# Patient Record
Sex: MALE | Race: WHITE | Employment: PART TIME | URBAN - METROPOLITAN AREA
[De-identification: names, ages, dates, MRNs, and addresses within clinical notes are randomized per-mention and may not be internally consistent; named-entity substitution may affect disease eponyms.]

---

## 2018-01-22 ENCOUNTER — HOSPITAL ENCOUNTER (INPATIENT)
Age: 28
LOS: 3 days | Discharge: HOME | DRG: 885 | End: 2018-01-26
Attending: EMERGENCY MEDICINE | Admitting: PSYCHIATRY & NEUROLOGY
Payer: OTHER GOVERNMENT

## 2018-01-22 DIAGNOSIS — R45.851 SUICIDAL THOUGHTS: ICD-10-CM

## 2018-01-22 DIAGNOSIS — F32.A DEPRESSION, UNSPECIFIED DEPRESSION TYPE: Primary | ICD-10-CM

## 2018-01-22 LAB
ALBUMIN SERPL-MCNC: 4.7 G/DL (ref 3.4–5)
ALBUMIN/GLOB SERPL: 1.4 {RATIO} (ref 0.8–1.7)
ALP SERPL-CCNC: 60 U/L (ref 45–117)
ALT SERPL-CCNC: 190 U/L (ref 16–61)
AMPHET UR QL SCN: NEGATIVE
ANION GAP SERPL CALC-SCNC: 6 MMOL/L (ref 3–18)
AST SERPL-CCNC: 59 U/L (ref 15–37)
BARBITURATES UR QL SCN: NEGATIVE
BASOPHILS # BLD: 0 K/UL (ref 0–0.1)
BASOPHILS NFR BLD: 0 % (ref 0–2)
BENZODIAZ UR QL: NEGATIVE
BILIRUB SERPL-MCNC: 0.7 MG/DL (ref 0.2–1)
BUN SERPL-MCNC: 10 MG/DL (ref 7–18)
BUN/CREAT SERPL: 12 (ref 12–20)
CALCIUM SERPL-MCNC: 9 MG/DL (ref 8.5–10.1)
CANNABINOIDS UR QL SCN: NEGATIVE
CHLORIDE SERPL-SCNC: 104 MMOL/L (ref 100–108)
CO2 SERPL-SCNC: 31 MMOL/L (ref 21–32)
COCAINE UR QL SCN: NEGATIVE
CREAT SERPL-MCNC: 0.81 MG/DL (ref 0.6–1.3)
DIFFERENTIAL METHOD BLD: ABNORMAL
EOSINOPHIL # BLD: 0.1 K/UL (ref 0–0.4)
EOSINOPHIL NFR BLD: 1 % (ref 0–5)
ERYTHROCYTE [DISTWIDTH] IN BLOOD BY AUTOMATED COUNT: 12.4 % (ref 11.6–14.5)
ETHANOL SERPL-MCNC: <3 MG/DL (ref 0–3)
GLOBULIN SER CALC-MCNC: 3.3 G/DL (ref 2–4)
GLUCOSE SERPL-MCNC: 87 MG/DL (ref 74–99)
HCT VFR BLD AUTO: 45.7 % (ref 36–48)
HDSCOM,HDSCOM: NORMAL
HGB BLD-MCNC: 15.5 G/DL (ref 13–16)
LYMPHOCYTES # BLD: 2.1 K/UL (ref 0.9–3.6)
LYMPHOCYTES NFR BLD: 21 % (ref 21–52)
MCH RBC QN AUTO: 30.2 PG (ref 24–34)
MCHC RBC AUTO-ENTMCNC: 33.9 G/DL (ref 31–37)
MCV RBC AUTO: 88.9 FL (ref 74–97)
METHADONE UR QL: NEGATIVE
MONOCYTES # BLD: 0.9 K/UL (ref 0.05–1.2)
MONOCYTES NFR BLD: 9 % (ref 3–10)
NEUTS SEG # BLD: 7 K/UL (ref 1.8–8)
NEUTS SEG NFR BLD: 69 % (ref 40–73)
OPIATES UR QL: NEGATIVE
PCP UR QL: NEGATIVE
PLATELET # BLD AUTO: 272 K/UL (ref 135–420)
PMV BLD AUTO: 11.9 FL (ref 9.2–11.8)
POTASSIUM SERPL-SCNC: 3.8 MMOL/L (ref 3.5–5.5)
PROT SERPL-MCNC: 8 G/DL (ref 6.4–8.2)
RBC # BLD AUTO: 5.14 M/UL (ref 4.7–5.5)
SODIUM SERPL-SCNC: 141 MMOL/L (ref 136–145)
WBC # BLD AUTO: 10.1 K/UL (ref 4.6–13.2)

## 2018-01-22 PROCEDURE — 80307 DRUG TEST PRSMV CHEM ANLYZR: CPT | Performed by: EMERGENCY MEDICINE

## 2018-01-22 PROCEDURE — 80053 COMPREHEN METABOLIC PANEL: CPT | Performed by: NURSE PRACTITIONER

## 2018-01-22 PROCEDURE — 99282 EMERGENCY DEPT VISIT SF MDM: CPT

## 2018-01-22 PROCEDURE — 85025 COMPLETE CBC W/AUTO DIFF WBC: CPT | Performed by: NURSE PRACTITIONER

## 2018-01-22 NOTE — IP AVS SNAPSHOT
[facility] 
 
 
 [address] [facility] [address] 66 Patient: Rashid Greenberg MRN: [id number] :1990 About your hospitalization You were admitted on:  2018 You last received care in the:  [facility] 1 ADULT CHEM DEP You were discharged on:  2018 Why you were hospitalized Your primary diagnosis was:  Bipolar 2 Disorder, Major Depressive Episode (Hcc) Follow-up Information Follow up With Details Comments Contact Info [address] [place] [address] On 2018 with Dr Bella Young @ [facility].. Please arrive 12:30pm to complete registration. . A Medication Appointment will be made after this Initial session. 20 [address] [address] 
[phone] Discharge Orders None A check gustavo indicates which time of day the medication should be taken. My Medications START taking these medications Instructions Each Dose to Equal  
 Morning Noon Evening Bedtime  
 traZODone 50 mg tablet Commonly known as:  Rae Barnett Your last dose was: Your next dose is: Take 1 Tab by mouth nightly. Indications: insomnia associated with depression 50 mg CHANGE how you take these medications Instructions Each Dose to Equal  
 Morning Noon Evening Bedtime  
 lithium carbonate 150 mg capsule What changed:   
- how much to take - when to take this Your last dose was: Your next dose is: Take 1 Cap by mouth three (3) times daily (with meals). Indications: Bipolar Disorder 150 mg  
    
   
   
   
  
 lurasidone 20 mg Tab tablet Commonly known as:  Evelin Guzman What changed:   
- medication strength 
- how much to take - when to take this Your last dose was: Your next dose is: Take 1 Tab by mouth daily (with breakfast).  Indications: DEPRESSION ASSOCIATED WITH BIPOLAR DISORDER  
 20 mg  
 Where to Get Your Medications Information on where to get these meds will be given to you by the nurse or doctor. ! Ask your nurse or doctor about these medications  
  lithium carbonate 150 mg capsule  
 lurasidone 20 mg Tab tablet  
 traZODone 50 mg tablet Discharge Instructions BEHAVIORAL HEALTH NURSING DISCHARGE NOTE Emergency Numbers [facility] [facility] Desk: [phone] [place] [facility]: [phone] Suicide Prevention Line: [phone] (TALK) The following personal items collected during your admission are returned to you:  
Dental Appliance: Dental Appliances: None Vision:   
Hearing Aid:   
Jewelry: Jewelry: None Clothing: Clothing: Belt, Footwear, Pants, Shirt, Socks, Undergarments Other Valuables: Other Valuables: Cell Phone, [email or web address] Valuables sent to safe: Personal Items Sent to Safe:  (two debit visa cards,  identification) The discharge information has been reviewed with the patient. The patient verbalized understanding. Boosterville Activation Thank you for requesting access to [employer]. Please follow the instructions below to securely access and download your online medical record. Boosterville allows you to send messages to your doctor, view your test results, renew your prescriptions, schedule appointments, and more. How Do I Sign Up? In your internet browser, go to [email or web address] Click on the First Time User? Click Here link in the Sign In box. You will be redirect to the New Member Sign Up page. Enter your Boosterville Access Code exactly as it appears below. You will not need to use this code after youve completed the sign-up process. If you do not sign up before the expiration date, you must request a new code. Boost[place] Access Code: [email or web address] Expires: 2018  9:10 AM (This is the date your Boosterville access code will ) Enter the last four digits of your Social Security Number (xxxx) and Date of Birth (mm/dd/yyyy) as indicated and click Submit. You will be taken to the next sign-up page. Create a [employer] ID. This will be your [employer] login ID and cannot be changed, so think of one that is secure and easy to remember. Create a [employer] password. You can change your password at any time. Enter your Password Reset Question and Answer. This can be used at a later time if you forget your password. Enter your e-mail address. You will receive e-mail notification when new information is available in [address] Click Sign Up. You can now view and download portions of your medical record. Click the Soapbox link to download a portable copy of your medical information. Additional Information If you have questions, please visit the Frequently Asked Questions section of the [employer] website at [email or web address] Remember, [employer] is NOT to be used for urgent needs. For medical emergencies, dial 911. Patient armband removed and shredded [employer] Announcement We are excited to announce that we are making your provider's discharge notes available to you in [employer]. You will see these notes when they are completed and signed by the physician that discharged you from your recent hospital stay. If you have any questions or concerns about any information you see in [employer], please call the Health Information Department where you were seen or reach out to your Primary Care Provider for more information about your plan of care. Introducing [facility] [place] [facility]! [facility] introduces [employer] patient portal. Now you can access parts of your medical record, email your doctor's office, and request medication refills online. 1. In your internet browser, go to [email or web address] 2. Click on the First Time User? Click Here link in the Sign In box.  You will see the New Member Sign Up page. 3. Enter your SwipeStation Access Code exactly as it appears below. You will not need to use this code after youve completed the sign-up process. If you do not sign up before the expiration date, you must request a new code. · SwipeStation Access Code: [email or web address] Expires: 4/23/2018  9:10 AM 
 
4. Enter the last four digits of your Social Security Number (xxxx) and Date of Birth (mm/dd/yyyy) as indicated and click Submit. You will be taken to the next sign-up page. 5. Create a [employer] Holdings Internationalt ID. This will be your SwipeStation login ID and cannot be changed, so think of one that is secure and easy to remember. 6. Create a SwipeStation password. You can change your password at any time. 7. Enter your Password Reset Question and Answer. This can be used at a later time if you forget your password. 8. Enter your e-mail address. You will receive e-mail notification when new information is available in [address] 9. Click Sign Up. You can now view and download portions of your medical record. 10. Click the Download Summary menu link to download a portable copy of your medical information. If you have questions, please visit the Frequently Asked Questions section of the SwipeStation website. Remember, SwipeStation is NOT to be used for urgent needs. For medical emergencies, dial 911. Now available from your iPhone and Android! Providers Seen During Your Hospitalization Provider Specialty Primary office phone Jeanine Arana MD Emergency Medicine [phone] Nolvia Campos MD Emergency Medicine [phone] Ada Foy MD Psychiatry [phone] Your Primary Care Physician (PCP) Primary Care Physician Office Phone Office Fax NONE ** None ** ** None ** You are allergic to the following Allergen Reactions Amoxicillin Anaphylaxis Recent Documentation Height Weight BMI Smoking Status 1.778 m 99.8 kg 31.57 kg/m2 Current Every Day Smoker Emergency Contacts Name Discharge Info Relation Home Work Mobile [facility] DISCHARGE CAREGIVER [3] Father [15] [phone] Patient Belongings The following personal items are in your possession at time of discharge: 
  Dental Appliances: None         Home Medications: None   Jewelry: None  Clothing: Belt, Footwear, Pants, Shirt, Socks, Undergarments    Other Valuables: Pako, Julio Cesar Gentleman  Personal Items Sent to Safe:  (two debit visa cards,  identification) Please provide this summary of care documentation to your next provider. Signatures-by signing, you are acknowledging that this After Visit Summary has been reviewed with you and you have received a copy. Patient Signature:  ____________________________________________________________ Date:  ____________________________________________________________  
  
Liliana Dale Provider Signature:  ____________________________________________________________ Date:  ____________________________________________________________

## 2018-01-22 NOTE — ED TRIAGE NOTES
Pt crying in triage, unable to focus, delayed answers. Pt states he has bipolar disorder & feels suicidal.  Pt denies HI.

## 2018-01-22 NOTE — IP AVS SNAPSHOT
Summary of Care Report The Summary of Care report has been created to help improve care coordination. Users with access to Instagarage or [address] (Web-based application) may access additional patient information including the Discharge Summary. If you are not currently a [address] user and need more information, please call the number listed below in the [place] [address] section and ask to be connected with Medical Records. Facility Information Name Address Phone [address] [facility] [address] [facility] [address] [phone] Patient Information Patient Name Sex GWEN Mccall ([id number]) Male 1990 Discharge Information Admitting Provider Service Area Unit Charmaine Ruth MD / [address] 1 Adult Chem Dep / [phone] Discharge Provider Discharge Date/Time Discharge Disposition Destination (none) 2018 Morning (Pending) AHR (none) Patient Language Language ENGLISH [13] Hospital Problems as of 2018  Never Reviewed Class Noted - Resolved Last Modified POA Active Problems * (Principal)Bipolar 2 disorder, major depressive episode ([facility] [address]  2018 - Present 2018 by Charmaine Ruth MD Yes Entered by Charmaine Ruth MD  
  
You are allergic to the following Allergen Reactions Amoxicillin Anaphylaxis Current Discharge Medication List  
  
START taking these medications Dose & Instructions Dispensing Information Comments  
 traZODone 50 mg tablet Commonly known as:  Emanuel Fritz Dose:  50 mg Take 1 Tab by mouth nightly. Indications: insomnia associated with depression Quantity:  30 Tab Refills:  0 CONTINUE these medications which have CHANGED Dose & Instructions Dispensing Information Comments  
 lithium carbonate 150 mg capsule What changed:   
- how much to take - when to take this Dose:  150 mg Take 1 Cap by mouth three (3) times daily (with meals). Indications: Bipolar Disorder Quantity:  90 Cap Refills:  0  
   
 lurasidone 20 mg Tab tablet Commonly known as:  Jose Swartz What changed:   
- medication strength 
- how much to take - when to take this Dose:  20 mg Take 1 Tab by mouth daily (with breakfast). Indications: DEPRESSION ASSOCIATED WITH BIPOLAR DISORDER Quantity:  30 Tab Refills:  0 Follow-up Information Follow up With Details Comments Contact Info [address] [place] [address] On 1/30/2018 with Dr Clya Stahl @ [facility].. Please arrive 12:30pm to complete registration. . A Medication Appointment will be made after this Initial session. [address] [place] [address] [phone] Discharge Instructions BEHAVIORAL HEALTH NURSING DISCHARGE NOTE Emergency Numbers [facility] [facility] Desk: [phone] [place] [facility]: [phone] Suicide Prevention Line: [phone] (TALK) The following personal items collected during your admission are returned to you:  
Dental Appliance: Dental Appliances: None Vision:   
Hearing Aid:   
Jewelry: Jewelry: None Clothing: Clothing: Belt, Footwear, Pants, Shirt, Socks, Undergarments Other Valuables: Other Valuables: Cell Phone, [employer] Valuables sent to safe: Personal Items Sent to Safe:  (two debit visa cards,  identification) The discharge information has been reviewed with the patient. The patient verbalized understanding. DocLanding Activation Thank you for requesting access to DocLanding. Please follow the instructions below to securely access and download your online medical record. DocLanding allows you to send messages to your doctor, view your test results, renew your prescriptions, schedule appointments, and more. How Do I Sign Up? In your internet browser, go to [email or web address] Click on the First Time User? Click Here link in the Sign In box. You will be redirect to the New Member Sign Up page. Enter your SHERPA assistant Access Code exactly as it appears below. You will not need to use this code after youve completed the sign-up process. If you do not sign up before the expiration date, you must request a new code. SHERPA assistant Access Code: [email or web address] Expires: 2018  9:10 AM (This is the date your SHERPA assistant access code will ) Enter the last four digits of your Social Security Number (xxxx) and Date of Birth (mm/dd/yyyy) as indicated and click Submit. You will be taken to the next sign-up page. Create a SHERPA assistant ID. This will be your SHERPA assistant login ID and cannot be changed, so think of one that is secure and easy to remember. Create a SHERPA assistant password. You can change your password at any time. Enter your Password Reset Question and Answer. This can be used at a later time if you forget your password. Enter your e-mail address. You will receive e-mail notification when new information is available in [address] Click Sign Up. You can now view and download portions of your medical record. Click the Yandex link to download a portable copy of your medical information. Additional Information If you have questions, please visit the Frequently Asked Questions section of the SHERPA assistant website at [email or web address] Remember, SHERPA assistant is NOT to be used for urgent needs. For medical emergencies, dial 911. Patient armband removed and shredded Chart Review Routing History No Routing History on File

## 2018-01-23 PROBLEM — F31.9 BIPOLAR 1 DISORDER (HCC): Status: ACTIVE | Noted: 2018-01-23

## 2018-01-23 PROBLEM — F31.81 BIPOLAR 2 DISORDER, MAJOR DEPRESSIVE EPISODE (HCC): Status: ACTIVE | Noted: 2018-01-23

## 2018-01-23 LAB
APPEARANCE UR: CLEAR
BACTERIA URNS QL MICRO: ABNORMAL /HPF
BILIRUB UR QL: ABNORMAL
COLOR UR: ABNORMAL
EPITH CASTS URNS QL MICRO: ABNORMAL /LPF (ref 0–5)
GLUCOSE UR STRIP.AUTO-MCNC: NEGATIVE MG/DL
HGB UR QL STRIP: NEGATIVE
KETONES UR QL STRIP.AUTO: ABNORMAL MG/DL
LEUKOCYTE ESTERASE UR QL STRIP.AUTO: ABNORMAL
MUCOUS THREADS URNS QL MICRO: ABNORMAL /LPF
NITRITE UR QL STRIP.AUTO: NEGATIVE
PH UR STRIP: 6 [PH] (ref 5–8)
PROT UR STRIP-MCNC: 30 MG/DL
RBC #/AREA URNS HPF: ABNORMAL /HPF (ref 0–5)
SP GR UR REFRACTOMETRY: >1.03 (ref 1–1.03)
TSH SERPL DL<=0.05 MIU/L-ACNC: 0.86 UIU/ML (ref 0.36–3.74)
UROBILINOGEN UR QL STRIP.AUTO: 1 EU/DL (ref 0.2–1)
WBC URNS QL MICRO: ABNORMAL /HPF (ref 0–4)

## 2018-01-23 PROCEDURE — 36415 COLL VENOUS BLD VENIPUNCTURE: CPT | Performed by: PSYCHIATRY & NEUROLOGY

## 2018-01-23 PROCEDURE — 84443 ASSAY THYROID STIM HORMONE: CPT | Performed by: PSYCHIATRY & NEUROLOGY

## 2018-01-23 PROCEDURE — 65220000005 HC RM SEMIPRIVATE PSYCH 3 OR 4 BED

## 2018-01-23 PROCEDURE — 74011250637 HC RX REV CODE- 250/637: Performed by: PSYCHIATRY & NEUROLOGY

## 2018-01-23 PROCEDURE — 81001 URINALYSIS AUTO W/SCOPE: CPT | Performed by: NURSE PRACTITIONER

## 2018-01-23 RX ORDER — LITHIUM CARBONATE 150 MG/1
150 CAPSULE ORAL 2 TIMES DAILY
Status: DISCONTINUED | OUTPATIENT
Start: 2018-01-23 | End: 2018-01-26 | Stop reason: HOSPADM

## 2018-01-23 RX ORDER — LORAZEPAM 2 MG/ML
1 INJECTION INTRAMUSCULAR
Status: DISCONTINUED | OUTPATIENT
Start: 2018-01-23 | End: 2018-01-26 | Stop reason: HOSPADM

## 2018-01-23 RX ORDER — HALOPERIDOL 5 MG/1
5 TABLET ORAL
Status: DISCONTINUED | OUTPATIENT
Start: 2018-01-23 | End: 2018-01-26 | Stop reason: HOSPADM

## 2018-01-23 RX ORDER — HALOPERIDOL 5 MG/ML
5 INJECTION INTRAMUSCULAR
Status: DISCONTINUED | OUTPATIENT
Start: 2018-01-23 | End: 2018-01-26 | Stop reason: HOSPADM

## 2018-01-23 RX ORDER — TRAZODONE HYDROCHLORIDE 50 MG/1
50 TABLET ORAL
Status: DISCONTINUED | OUTPATIENT
Start: 2018-01-23 | End: 2018-01-23

## 2018-01-23 RX ORDER — IBUPROFEN 600 MG/1
600 TABLET ORAL
Status: DISCONTINUED | OUTPATIENT
Start: 2018-01-23 | End: 2018-01-26 | Stop reason: HOSPADM

## 2018-01-23 RX ORDER — LORAZEPAM 1 MG/1
1 TABLET ORAL
Status: DISCONTINUED | OUTPATIENT
Start: 2018-01-23 | End: 2018-01-26 | Stop reason: HOSPADM

## 2018-01-23 RX ORDER — LORAZEPAM 2 MG/ML
2 INJECTION INTRAMUSCULAR
Status: DISCONTINUED | OUTPATIENT
Start: 2018-01-23 | End: 2018-01-26 | Stop reason: HOSPADM

## 2018-01-23 RX ORDER — LORAZEPAM 1 MG/1
2 TABLET ORAL
Status: DISCONTINUED | OUTPATIENT
Start: 2018-01-23 | End: 2018-01-26 | Stop reason: HOSPADM

## 2018-01-23 RX ORDER — LITHIUM CARBONATE 150 MG/1
CAPSULE ORAL 3 TIMES DAILY
COMMUNITY
End: 2018-01-26

## 2018-01-23 RX ORDER — TRAZODONE HYDROCHLORIDE 50 MG/1
50 TABLET ORAL
Status: DISCONTINUED | OUTPATIENT
Start: 2018-01-23 | End: 2018-01-26 | Stop reason: HOSPADM

## 2018-01-23 RX ADMIN — LORAZEPAM 2 MG: 1 TABLET ORAL at 17:45

## 2018-01-23 RX ADMIN — TRAZODONE HYDROCHLORIDE 50 MG: 50 TABLET ORAL at 21:45

## 2018-01-23 RX ADMIN — LITHIUM CARBONATE 150 MG: 150 CAPSULE, GELATIN COATED ORAL at 21:45

## 2018-01-23 RX ADMIN — LURASIDONE HYDROCHLORIDE 20 MG: 20 TABLET, FILM COATED ORAL at 12:16

## 2018-01-23 NOTE — ED PROVIDER NOTES
HPI Comments: Pt presents with hx of mh illness and does not take her medications. States suicidal thoughts, no plan, no medical complaints    Patient is a 32 y.o. male presenting with mental health disorder. The history is provided by the patient. No  was used. Mental Health Problem   The primary symptoms include dysphoric mood. The current episode started today. This is a new problem. The dysphoric mood began this week. The mood has been unchanged since its onset. He characterizes the problem as moderate. The mood includes feelings of sadness. The degree of incapacity that he is experiencing as a consequence of his illness is moderate. Additional symptoms of the illness include anhedonia. Additional symptoms of the illness do not include headaches or abdominal pain. He admits to suicidal ideas. He does not have a plan to commit suicide. He contemplates harming himself. He has not already injured self. He does not contemplate injuring another person. He has not already  injured another person. Risk factors that are present for mental illness include a history of mental illness. History reviewed. No pertinent past medical history. History reviewed. No pertinent surgical history. History reviewed. No pertinent family history. Social History     Social History    Marital status: SINGLE     Spouse name: N/A    Number of children: N/A    Years of education: N/A     Occupational History    Not on file. Social History Main Topics    Smoking status: Current Every Day Smoker    Smokeless tobacco: Never Used    Alcohol use Yes    Drug use: No    Sexual activity: Not on file     Other Topics Concern    Not on file     Social History Narrative    No narrative on file         ALLERGIES: Amoxicillin    Review of Systems   Constitutional: Negative for fever. Gastrointestinal: Negative for abdominal pain. Neurological: Negative for dizziness and headaches. Psychiatric/Behavioral: Positive for dysphoric mood. All other systems reviewed and are negative. Vitals:    01/22/18 1625   BP: (!) 147/92   Pulse: (!) 103   Resp: 18   Temp: 98.2 °F (36.8 °C)   SpO2: 100%   Weight: 99.8 kg (220 lb)   Height: 5' 10\" (1.778 m)            Physical Exam   Constitutional: He is oriented to person, place, and time. He appears well-developed and well-nourished. HENT:   Head: Normocephalic and atraumatic. Eyes: Conjunctivae and EOM are normal. Pupils are equal, round, and reactive to light. Neck: Normal range of motion. Neck supple. Cardiovascular: Normal rate and regular rhythm. Pulmonary/Chest: Effort normal and breath sounds normal.   Abdominal: Soft. Bowel sounds are normal.   Musculoskeletal: Normal range of motion. Neurological: He is alert and oriented to person, place, and time. He has normal reflexes. Skin: Skin is warm and dry. Psychiatric: His speech is normal and behavior is normal. Judgment and thought content normal. Cognition and memory are normal. He exhibits a depressed mood. Nursing note and vitals reviewed.        MDM  Number of Diagnoses or Management Options  Depression, unspecified depression type: established and improving  Suicidal thoughts: established and improving  Diagnosis management comments: Pt cleared for  evaluation    Per Jeramy Hall with crisis pt to be admitted to inpatient  floor       Amount and/or Complexity of Data Reviewed  Clinical lab tests: ordered and reviewed    Risk of Complications, Morbidity, and/or Mortality  Presenting problems: moderate  Diagnostic procedures: moderate  Management options: moderate    Patient Progress  Patient progress: stable    ED Course       Procedures          Vitals:  Patient Vitals for the past 12 hrs:   Temp Pulse Resp BP SpO2   01/22/18 1625 98.2 °F (36.8 °C) (!) 103 18 (!) 147/92 100 %          Lab findings:  Recent Results (from the past 12 hour(s))   DRUG SCREEN, URINE    Collection Time: 01/22/18  4:50 PM   Result Value Ref Range    BENZODIAZEPINES NEGATIVE  NEG      BARBITURATES NEGATIVE  NEG      THC (TH-CANNABINOL) NEGATIVE  NEG      OPIATES NEGATIVE  NEG      PCP(PHENCYCLIDINE) NEGATIVE  NEG      COCAINE NEGATIVE  NEG      AMPHETAMINES NEGATIVE  NEG      METHADONE NEGATIVE  NEG      HDSCOM (NOTE)    ETHYL ALCOHOL    Collection Time: 01/22/18  5:13 PM   Result Value Ref Range    ALCOHOL(ETHYL),SERUM <3 0 - 3 MG/DL   CBC WITH AUTOMATED DIFF    Collection Time: 01/22/18  5:13 PM   Result Value Ref Range    WBC 10.1 4.6 - 13.2 K/uL    RBC 5.14 4.70 - 5.50 M/uL    HGB 15.5 13.0 - 16.0 g/dL    HCT 45.7 36.0 - 48.0 %    MCV 88.9 74.0 - 97.0 FL    MCH 30.2 24.0 - 34.0 PG    MCHC 33.9 31.0 - 37.0 g/dL    RDW 12.4 11.6 - 14.5 %    PLATELET 766 742 - 267 K/uL    MPV 11.9 (H) 9.2 - 11.8 FL    NEUTROPHILS 69 40 - 73 %    LYMPHOCYTES 21 21 - 52 %    MONOCYTES 9 3 - 10 %    EOSINOPHILS 1 0 - 5 %    BASOPHILS 0 0 - 2 %    ABS. NEUTROPHILS 7.0 1.8 - 8.0 K/UL    ABS. LYMPHOCYTES 2.1 0.9 - 3.6 K/UL    ABS. MONOCYTES 0.9 0.05 - 1.2 K/UL    ABS. EOSINOPHILS 0.1 0.0 - 0.4 K/UL    ABS. BASOPHILS 0.0 0.0 - 0.1 K/UL    DF AUTOMATED     METABOLIC PANEL, COMPREHENSIVE    Collection Time: 01/22/18  5:13 PM   Result Value Ref Range    Sodium 141 136 - 145 mmol/L    Potassium 3.8 3.5 - 5.5 mmol/L    Chloride 104 100 - 108 mmol/L    CO2 31 21 - 32 mmol/L    Anion gap 6 3.0 - 18 mmol/L    Glucose 87 74 - 99 mg/dL    BUN 10 7.0 - 18 MG/DL    Creatinine 0.81 0.6 - 1.3 MG/DL    BUN/Creatinine ratio 12 12 - 20      GFR est AA >60 >60 ml/min/1.73m2    GFR est non-AA >60 >60 ml/min/1.73m2    Calcium 9.0 8.5 - 10.1 MG/DL    Bilirubin, total 0.7 0.2 - 1.0 MG/DL    ALT (SGPT) 190 (H) 16 - 61 U/L    AST (SGOT) 59 (H) 15 - 37 U/L    Alk.  phosphatase 60 45 - 117 U/L    Protein, total 8.0 6.4 - 8.2 g/dL    Albumin 4.7 3.4 - 5.0 g/dL    Globulin 3.3 2.0 - 4.0 g/dL    A-G Ratio 1.4 0.8 - 1.7     URINALYSIS W/ RFLX MICROSCOPIC Collection Time: 01/23/18 12:41 AM   Result Value Ref Range    Color DARK YELLOW      Appearance CLEAR      Specific gravity >1.030 (H) 1.005 - 1.030    pH (UA) 6.0 5.0 - 8.0      Protein 30 (A) NEG mg/dL    Glucose NEGATIVE  NEG mg/dL    Ketone TRACE (A) NEG mg/dL    Bilirubin SMALL (A) NEG      Blood NEGATIVE  NEG      Urobilinogen 1.0 0.2 - 1.0 EU/dL    Nitrites NEGATIVE  NEG      Leukocyte Esterase TRACE (A) NEG     URINE MICROSCOPIC ONLY    Collection Time: 01/23/18 12:41 AM   Result Value Ref Range    WBC 1 to 4 0 - 4 /hpf    RBC 0 to 2 0 - 5 /hpf    Epithelial cells FEW 0 - 5 /lpf    Bacteria FEW (A) NEG /hpf    Mucus 2+ (A) NEG /lpf             Disposition:    Diagnosis:   1. Depression, unspecified depression type    2.  Suicidal thoughts        Disposition:  Admitted to inpatient  floor          Rodger TAYLOR

## 2018-01-23 NOTE — H&P
History and Physical        Patient: Keegan Pickering               Sex: male          DOA: 1/22/2018         YOB: 1990      Age:  32 y.o.        LOS:  LOS: 0 days        HPI:     Keegan Pickering is a 32 y.o. male who was admitted experiencing depression and suicidal ideation. Active Problems:    Bipolar 1 disorder ([address] (1/23/2018)        History reviewed. No pertinent past medical history. History reviewed. No pertinent surgical history. History reviewed. No pertinent family history. Social History     Social History    Marital status: SINGLE     Spouse name: N/A    Number of children: NONE     Years of education:  graduate     Social History Main Topics    Smoking status: Current Every Day Smoker    Smokeless tobacco: Never Used    Alcohol use Yes    Drug use: No    Sexual activity: Not Asked     Other Topics Concern    None     Social History Narrative    Lives with his parents. States appetite and sleep are okay. Works in food service. Prior to Admission medications    Medication Sig Start Date End Date Taking? Authorizing Provider   lithium carbonate 150 mg capsule Take  by mouth three (3) times daily. Yes Historical Provider   lurasidone (LATUDA) 120 mg tab tablet Take  by mouth. Indications: DEPRESSION ASSOCIATED WITH BIPOLAR DISORDER   Yes Historical Provider       Allergies   Allergen Reactions    Amoxicillin Anaphylaxis       Review of Systems  A comprehensive review of systems was negative. Physical Exam:      Visit Vitals    /73 (BP 1 Location: Right arm, BP Patient Position: At rest)    Pulse 79    Temp 97.2 °F (36.2 °C)    Resp 18    Ht 5' 10\" (1.778 m)    Wt 220 lb (99.8 kg)    SpO2 99%    BMI 31.57 kg/m2       Physical Exam:    General:  Alert, cooperative, well nourished, well developed  male, no distress, appears stated age. Eyes:  Conjunctivae/corneas clear. PERRL, EOMs intact.  Fundi benign   Ears:  Normal TMs and external ear canals both ears. Nose: Nares normal. Septum midline. Mucosa normal. No drainage or sinus tenderness. Mouth/Throat: Lips, mucosa, and tongue normal. Teeth and gums normal.   Neck: Supple, symmetrical, trachea midline, no adenopathy, thyroid: no enlargement/tenderness/nodules, no carotid bruit and no JVD. Back:   Symmetric, no curvature. ROM normal. No CVA tenderness. Lungs:   Clear to auscultation bilaterally. Heart:  Regular rate and rhythm, S1, S2 normal, no murmur, click, rub or gallop. Abdomen:   Soft, non-tender. Bowel sounds normal. No masses,  No organomegaly. Extremities: Extremities normal, atraumatic, no cyanosis or edema. Pulses: 2+ and symmetric all extremities. Skin: Skin color, texture, turgor normal. No rashes or lesions   Lymph nodes: Cervical, supraclavicular, and axillary nodes normal.   Neurologic: CNII-XII intact. Normal strength, sensation and reflexes throughout.            Assessment/Plan     Depression  Suicidal ideation  Labs reviewed  Treatment per physician's orders

## 2018-01-23 NOTE — BSMART NOTE
OCCUPATIONAL THERAPY PROGRESS NOTE  Group Time:  6843  Attendance: The patient attended full group. Participation:  The patient participated with minimal elaboration in the activity. Attention:  The patient was able to focus on the activity. Interaction:  The patient acknowledges others or responds to questions,  with no spontaneous interaction. Participated as asked and called on. Identified a self created stressor (group topic) he experiences and listened to ways to possibly address issue.

## 2018-01-23 NOTE — ED NOTES
Assumed care of patient from results waiting,no s/s of distress no complaints offered. Continue to monitor.

## 2018-01-23 NOTE — ED NOTES
TRANSFER - OUT REPORT:    Verbal report given to [facility] RN(name) on [place]  being transferred to behavioral health(unit) for routine progression of care       Report consisted of patients Situation, Background, Assessment and   Recommendations(SBAR). Information from the following report(s) SBAR was reviewed with the receiving nurse. Lines:       Opportunity for questions and clarification was provided. Patient transported with:   security & transporter.

## 2018-01-23 NOTE — H&P
[address] [facility] [address]  Inpatient Admission Note    Date of Service:  01/23/18    Historian(s): Falguni Rojas and chart review  Referral Source: patient    Chief Complaint   depression    History of Present Illness     Falguni Rojas is a 32 y.o. male with a history of bipolar 2 disorder who presents for inpatient psychiatric hospitalization after developing SI with multiple plans including cutting his wrists or jumping off a bridge in the context of medication non-compliance for 1 year. Duration of symptoms is 6 months. Pt noted to be crying in triage, with inability to focus and with delayed answers. Pt endorses sleep disturbance and drinks whiskey to help with insomnia. Medications included Lithium and Latuda. Pt did not return to outpt treatment to obtain medication because the process causes him anxiety. On initial assessment, the pt is calm and cooperative. He readily participates in the interview. He states he will become more aggressive and agitated when he is not on medications. He reports becoming obsessive when trying to complete tasks but denies compulsions which resolve obsessions. He endorses depression of 6 months. He notes poor sleep where he consumes a pint of whisky daily for insomnia. He sleeps 4-5 hours per night. He endorses difficulty falling asleep and difficulty staying asleep. He denies a h/o alcohol withdrawals or seizures. He endorses anhedonia, guilt, decreased energy, hopelessness, helplessness and worthlessness, decreased concentration and decreased appetite. Currently, pt denies SI, HI & AVH. Psychiatric Review of Systems   Depression:  Endorses, see HPI. Anxiety: Endorses excessive worrying    Irritability: Denies low threshold of frustration or anger. Bipolar symptoms: Endorses history of decreased need for sleep associated with increased energy, racing thoughts, rapid speech and risky behavior.     Abuse/Trauma/PTSD: Denies history of verbal, physical or sexual abuse. Denies avoidant behavior related to trauma triggers, flashbacks, hypervigilance or nightmares. Psychosis: Denies AVH or delusions. Medical Review of Systems     Sleep: decreased per HPI. Appetite: decreased     10 point review of systems was completed. Significant findings are found in the HPI or MSE. Psychiatric Treatment History     Self-injurious behavior/risky thoughts or behaviors (past suicidal ideation/attempt): Endorses suicidal thoughts and denies self-harm or suicidal actions. Violence/Risk to others (past homicidal ideation/attempt):    Denies any prior history of violence or homicidal ideation. Previous psychiatric medication trials: Endorses    Previous psychiatric hospitalizations: Endorses   - twice in [place] and one other hospitalization in the [place] or [place]    Current therapist: Denies     Current psychiatric provider: Denies     Allergies      Allergies   Allergen Reactions    Amoxicillin Anaphylaxis       Medical History     History reviewed. No pertinent past medical history. History of neurological illness: Denies   History of head injuries: Denies      Medication(s)     No current facility-administered medications on file prior to encounter. No current outpatient prescriptions on file prior to encounter. Substance Abuse History     Tobacco: smokes 2-4 cigarettes per day   Alcohol: denied  Marijuana: denied  Cocaine: denied  Opiate: denied  Benzodiazepine: denied  Other: denied    Consequences: none    History of detox: none    History of substance abuse treatment: none    Family History     Medical Family History  Maternal: Brain disease  Paternal: MI    Psychiatric Family History  Maternal: Bipolar disorder  Paternal: Bipolar disorder    Family history of suicide? NO    Social History     Living Situation: Lives with his father    Employment: Works at [employer]. Formerly in [employer] Education: Gradated HS. Relationships/Children: No children, never , not in a relationship    Legal: Denies     Spirituality/Evangelical: None    Vitals/Labs      Vitals:    01/22/18 1625 01/23/18 0248 01/23/18 0331 01/23/18 0745   BP: (!) 147/92 135/86 134/83 120/73   Pulse: (!) 103 80 70 79   Resp: 18 16 20 18   Temp: 98.2 °F (36.8 °C) 97.8 °F (36.6 °C) 97.3 °F (36.3 °C) 97.2 °F (36.2 °C)   SpO2: 100% 99%     Weight: 99.8 kg (220 lb)  99.8 kg (220 lb)    Height: 5' 10\" (1.778 m)  5' 10\" (1.778 m)        Labs:   Results for orders placed or performed during the hospital encounter of 01/22/18   DRUG SCREEN, URINE   Result Value Ref Range    BENZODIAZEPINES NEGATIVE  NEG      BARBITURATES NEGATIVE  NEG      THC (TH-CANNABINOL) NEGATIVE  NEG      OPIATES NEGATIVE  NEG      PCP(PHENCYCLIDINE) NEGATIVE  NEG      COCAINE NEGATIVE  NEG      AMPHETAMINES NEGATIVE  NEG      METHADONE NEGATIVE  NEG      HDSCOM (NOTE)    ETHYL ALCOHOL   Result Value Ref Range    ALCOHOL(ETHYL),SERUM <3 0 - 3 MG/DL   CBC WITH AUTOMATED DIFF   Result Value Ref Range    WBC 10.1 4.6 - 13.2 K/uL    RBC 5.14 4.70 - 5.50 M/uL    HGB 15.5 13.0 - 16.0 g/dL    HCT 45.7 36.0 - 48.0 %    MCV 88.9 74.0 - 97.0 FL    MCH 30.2 24.0 - 34.0 PG    MCHC 33.9 31.0 - 37.0 g/dL    RDW 12.4 11.6 - 14.5 %    PLATELET 251 046 - 461 K/uL    MPV 11.9 (H) 9.2 - 11.8 FL    NEUTROPHILS 69 40 - 73 %    LYMPHOCYTES 21 21 - 52 %    MONOCYTES 9 3 - 10 %    EOSINOPHILS 1 0 - 5 %    BASOPHILS 0 0 - 2 %    ABS. NEUTROPHILS 7.0 1.8 - 8.0 K/UL    ABS. LYMPHOCYTES 2.1 0.9 - 3.6 K/UL    ABS. MONOCYTES 0.9 0.05 - 1.2 K/UL    ABS. EOSINOPHILS 0.1 0.0 - 0.4 K/UL    ABS.  BASOPHILS 0.0 0.0 - 0.1 K/UL    DF AUTOMATED     URINALYSIS W/ RFLX MICROSCOPIC   Result Value Ref Range    Color DARK YELLOW      Appearance CLEAR      Specific gravity >1.030 (H) 1.005 - 1.030    pH (UA) 6.0 5.0 - 8.0      Protein 30 (A) NEG mg/dL    Glucose NEGATIVE  NEG mg/dL    Ketone TRACE (A) NEG mg/dL    Bilirubin SMALL (A) NEG      Blood NEGATIVE  NEG      Urobilinogen 1.0 0.2 - 1.0 EU/dL    Nitrites NEGATIVE  NEG      Leukocyte Esterase TRACE (A) NEG     METABOLIC PANEL, COMPREHENSIVE   Result Value Ref Range    Sodium 141 136 - 145 mmol/L    Potassium 3.8 3.5 - 5.5 mmol/L    Chloride 104 100 - 108 mmol/L    CO2 31 21 - 32 mmol/L    Anion gap 6 3.0 - 18 mmol/L    Glucose 87 74 - 99 mg/dL    BUN 10 7.0 - 18 MG/DL    Creatinine 0.81 0.6 - 1.3 MG/DL    BUN/Creatinine ratio 12 12 - 20      GFR est AA >60 >60 ml/min/1.73m2    GFR est non-AA >60 >60 ml/min/1.73m2    Calcium 9.0 8.5 - 10.1 MG/DL    Bilirubin, total 0.7 0.2 - 1.0 MG/DL    ALT (SGPT) 190 (H) 16 - 61 U/L    AST (SGOT) 59 (H) 15 - 37 U/L    Alk. phosphatase 60 45 - 117 U/L    Protein, total 8.0 6.4 - 8.2 g/dL    Albumin 4.7 3.4 - 5.0 g/dL    Globulin 3.3 2.0 - 4.0 g/dL    A-G Ratio 1.4 0.8 - 1.7     URINE MICROSCOPIC ONLY   Result Value Ref Range    WBC 1 to 4 0 - 4 /hpf    RBC 0 to 2 0 - 5 /hpf    Epithelial cells FEW 0 - 5 /lpf    Bacteria FEW (A) NEG /hpf    Mucus 2+ (A) NEG /lpf       Mental Status Examination     Appearance/Hygiene 32 y.o. CM with good hygiene   Behavior/Social Relatedness Appropriate/relates well   Musculoskeletal Gait/Station: appropriate  Tone (flaccid, cogwheeling, spastic): not assessed   Psychomotor (hyperkinetic, hypokinetic): appropriate   Involuntary movements (tics, dyskinesias, akathisia, stereotypies): none   Speech   Rate, rhythm, volume, fluency and articulation are appropriate   Mood   States he is depressed    Affect    Sad but laughs appropriately during the interview.     Thought Process Linear and goal directed    Vagueness, incoherence, circumstantiality, tangentiality, neologisms, perseveration, flight of ideas, or self-contradictory statements not present on assessment   Thought Content and Perceptual Disturbances Denies delusions, ideas of reference, overvalued ideas, ruminations, obsession, compulsions, and phobias    Denies self-injurious behavior (SIB), suicidal ideation (SI), aggressive behavior or homicidal ideation (HI)    Denies auditory and visual hallucinations   Sensorium and Cognition  Grossly intact    Insight  fair   Judgment fair       Suicide Risk Assessment     Admission  Date/Time: 01/23/18    [x] Admission  [] Discharge     Key Factors:   Current admission precipitated by suicide attempt?   []  Yes     2    [x]  No     1     Suicide Attempt History  [] Past attempts of high lethality    2 []  Past attempts of low lethality    1 [x]  No previous attempts       0   Suicidal Ideation []  Constant suicidal thoughts      2 []  Intermittent or fleeting suicidal  thoughts  1 [x]  Denies current suicidal thoughts    0   Suicide Plan   []  Has plan with actual OR potential access to planned method    2 []  Has plan without access to planned method      1 [x]  No plan            0   Plan Lethality []  Highly lethal plan (Carbon monoxide, gun, hanging, jumping)    2 []  Moderate lethality of plan          1 [x]  Low lethality of plan (biting, head banging, superficial scratching, pillow over face)  0   Safety Plan Agreement  []  Unwilling OR unable to agree due to impaired reality testing   2   []  Patient is ambivalent and/or guarded      1 [x]  Reliably agrees        0   Current Morbid Thoughts (reunion fantasies, preoccupations with death) []  Constantly     2     []  Frequently    1 [x]  Rarely    0   Elopement Risk  []  High risk     2 []  Moderate risk    1 [x]   Low risk    0   Symptoms    [x]  Hopeless  [x]  Helpless  [x]  Anhedonia   [x]  Guilt/shame  []  Anger/rage  [x]  Anxiety  [x]  Insomnia   [x]  Agitation   []  Impulsivity  [x]  5-6 symptoms present    2 []  3-4 symptoms present    1  []  0-2 symptoms present    0     Total Score: 3  --------------------------------------------------------------------------------------------------------------  Subjective Appraisal of Risk:  []  Patient replies not trustworthy: several non-verbal cues. []  Patient replies questionable: trustworthy: at least 1 non-verbal cue. [x]  Patient replies appear trustworthy. Protective measures (select all that apply):  [x]  Successful past responses to stress  []  Spiritual/Orthodox beliefs  [x]  Capacity for reality testing  []  Positive therapeutic relationships  [x]  Social supports/connections  [x]  Positive coping skills  [x]  Frustration tolerance/optimism  []  Children or pets in the home  []  Sense of responsibility to family  [x]  Agrees to treatment plan and follow up    High Risk Diagnoses (select all that apply):  [x]  Depression/Bipolar Disorder  []  Dual Diagnosis  []  Cardiovascular Disease  []  Schizophrenia  []  Chronic Pain  []  Epilepsy  []  Cancer  []  Personality Disorder  []  HIV/AIDS  []  Multiple Sclerosis    Dangerousness Assessment (Suicide, homicide, property destruction. ..)    Risk Factors reviewed and risk assessed to be:  [] low  [] low-moderate  [x] moderate   [] moderate-high  [] high     Protection factors reviewed and risk assessed to be:  [] low  [x] low-moderate  [] moderate   [] moderate-high  [] high     Response to treatment and risk assessed to be:  [] low  [x] low-moderate  [] moderate   [] moderate-high  [] high     Support reviewed and risk assessed to be:  [] low  [x] low-moderate  [] moderate   [] moderate-high  [] high     Acceptance of Discharge and outpatient treatment reviewed and risk assessed to be:    [] low  [x] low-moderate  [] moderate   [] moderate-high  [] high   Overall risk assessed to be:  [] low  [x] low-moderate  [x] moderate   [] moderate-high  [] high       Assessment and Plan     Psychiatric Diagnoses:   Patient Active Problem List   Diagnosis Code    Bipolar 2 disorder, major depressive episode ([facility] F31.81       Medical Diagnoses: Elevated liver enzymes    Psychosocial and contextual factors:    1.   Medication non-compliance    Level of impairment/disability: Moderate-severe     Amelia Campbell is a 32 y.o. who is currently requiring acute stabilization after developing depression with SI in the context of medication non-compliance. The pt is willing to restart his home medications. Long discussion re: need for medication for mood stabilization and increased sleep due to his disorder. Also discussed trazodone being an antidepressant and will need to watch for development of hypomania. Noted this is unlikely based upon using lithium as a mood stabilizer. Denies SI, HI and AVH today. 1. Admit to locked inpatient behavioral health unit. Start milieu, group, art and occupation therapy. 2. Bipolar 2 disorder, mre depressed   - start Latuda 20mg po Qbreakfast   - start lithium 150mg po TID  3. Schedule trazodone for insomnia  4. Routine labs ordered and reviewed by this provider. 5. Reviewed instructions, risks, benefits and side effects. 6. Disposition/Follow-up: self-care/home, SW will assist in coordinating resources. Tentative date of discharge: 126/.       Patricia López MD  Psychiatrist  DR. [facility]

## 2018-01-23 NOTE — BSMART NOTE
ART THERAPY GROUP PROGRESS NOTE    PATIENT SCHEDULED FOR GROUP AT: 14:30    ATTENDANCE: 1/4    PARTICIPATION LEVEL:  Does not engage in the art process or gives up easily. ATTENTION LEVEL: Unable to attend to task at hand. FOCUS: Goals    SYMBOLIC & THEMATIC CONTENT AS NOTED IN IMAGERY: He presented with a dysphoric mood and kept to himself unless directly prompted. He responded when called upon and read from the group handout when asked, however did not participate in group discussion. He left without warning after about 1/4 of group to his room and did not return.

## 2018-01-23 NOTE — BSMART NOTE
Pt seen by Crisis in ED room 18       CC; SI, mood swings, off medications for Bipolar for a year. Pt is alert, oriented, cooperative. Pt endorsed SI with multiple plans. Denies HI or hallucinations. No evidence of a thought disorder. Thoughts are organized and goal directed. Pt reports sleep disturbance and has been drinking whiskey to help with sleep at night. Pt reports frequent mood swings, with depression and SI. He reports thinking of ways to end his life to include cutting his wrist, or going off a jeanie. Pt has avoided going back to a therapist to get back on medications stating the process makes him anxious. He has been off medications for a year. Pt was on [place]. Pt presents a danger to himself and requires in pt tx for safety. Discussed with on call Psychiatry, orders received  for adm. Plan: adm.   Discussed with ER MD.

## 2018-01-23 NOTE — BSMART NOTE
SW Contact:  . .. Also will help develop d/c plan which will connect pt to his  benefits, with him deciding on private practitoner or [address] [place] [address] center. .. Leaning towards [address] [place] [address] [phone] with Dr Juan Starkey for therapy recommended. Pt given goal sheets & handouts on self medicating as well as substance abuse. .. Reminded pt how his continued compliance with outpt services will help expand his internal cognitive focus, provide structured social interaction & a sense of accomplishment with task completion. Also CBT Handouts.

## 2018-01-23 NOTE — PROGRESS NOTES
Patient escorted to [facility] [place] [address] Unit via w/c by  and ED staff, alert, pleasant upon approach, oriented x 4, flat affect, dressed in scrubs, fair hygiene. Patient stated that he is here because of \"suicidal thoughts and have not taken his medicine in a year. \" Patient also stated that he has been \"feeling depressed with thoughts of slitting my wrist or jumping off a bridge for about 6 months. \" Presently, patient is able to contract for safety and denied thoughts of harm to self or others. Patient voiced that his sleep is broken and he has been drinking whiskey to help get to sleep at night. Additionally, patient voiced that he is medically retired from the [place] [address] and received treatment in [place] and [place] while in the [employer]. Patient oriented to room and unit routines; tolerated well snack and juice, then retired to bed; will monitor and maintain safe environment.

## 2018-01-23 NOTE — BH NOTES
Pt was encouraged to come out of room this morning to participate in program.  Pt sat goal to attend group  but when asked to attend next group refused to do so. Pt spent time  in room playing cards by self. Was informed that participation played a big   part in his treatment and discharge. Report any issues to staff and doctor. Safety on unit wearing   non skid socks and voice any negative concerns.

## 2018-01-23 NOTE — BH NOTES
Falguni Rojas is not participating in Recreational Therapy. Group time: 1 hour    Personal goal for participation: fresh air break    Goal orientation: social    Group therapy participation: refuse    Therapeutic interventions reviewed and discussed: Staff encouraged Pt.  To participate in group    Impression of participation: Pt refuse, chose to sat in the day area and read a book despite staff encouragement

## 2018-01-23 NOTE — BH NOTES
GROUP THERAPY PROGRESS NOTE    Omega Eric is participating in [place]. Group time: 30 minutes    Personal goal for participation: rules/ regulations    Goal orientation: community    Group therapy participation: minimal    Therapeutic interventions reviewed and discussed: He was educated on medication compliance and also utilizing support groups to help him deal with him depression. Impression of participation: He was quiet while in group but did communicate his emotions and feeling and verbalize he wants to get help for his depression.

## 2018-01-24 LAB
CHOLEST SERPL-MCNC: 179 MG/DL
HDLC SERPL-MCNC: 46 MG/DL (ref 40–60)
HDLC SERPL: 3.9 {RATIO} (ref 0–5)
LDLC SERPL CALC-MCNC: 100.4 MG/DL (ref 0–100)
LIPID PROFILE,FLP: ABNORMAL
TRIGL SERPL-MCNC: 163 MG/DL (ref ?–150)
VLDLC SERPL CALC-MCNC: 32.6 MG/DL

## 2018-01-24 PROCEDURE — 80061 LIPID PANEL: CPT | Performed by: PSYCHIATRY & NEUROLOGY

## 2018-01-24 PROCEDURE — 65220000005 HC RM SEMIPRIVATE PSYCH 3 OR 4 BED

## 2018-01-24 PROCEDURE — 74011250637 HC RX REV CODE- 250/637: Performed by: PSYCHIATRY & NEUROLOGY

## 2018-01-24 PROCEDURE — 36415 COLL VENOUS BLD VENIPUNCTURE: CPT | Performed by: PSYCHIATRY & NEUROLOGY

## 2018-01-24 RX ADMIN — LITHIUM CARBONATE 150 MG: 150 CAPSULE, GELATIN COATED ORAL at 08:02

## 2018-01-24 RX ADMIN — LURASIDONE HYDROCHLORIDE 20 MG: 20 TABLET, FILM COATED ORAL at 08:02

## 2018-01-24 RX ADMIN — LITHIUM CARBONATE 150 MG: 150 CAPSULE, GELATIN COATED ORAL at 20:20

## 2018-01-24 RX ADMIN — TRAZODONE HYDROCHLORIDE 50 MG: 50 TABLET ORAL at 20:19

## 2018-01-24 NOTE — BH NOTES
Denies SI HI AVH. Offers no complaints. Interacts appropriately with peers and staff. Pleasant and cooperative. Eats and tolerates evening meal well. Takes medicines without incident. Gripper socks and 15 minute checks in place for safety. Will continue to monitor and support.

## 2018-01-24 NOTE — PROGRESS NOTES
Problem: Falls - Risk of  Goal: *Absence of Falls  Document Rossy Fall Risk and appropriate interventions in the flowsheet. Daily, patient will remain free from falls during hospital stay. Outcome: Progressing Towards Goal  Fall Risk Interventions:     Patient will be absent of falls. Comments: Patient will be absent of falls.

## 2018-01-24 NOTE — BH NOTES
Treatment team met -     Medical Director: _____present   Psychiatrist: __x___present   Charge nurse: _x____present   MSW: __x___present   : _____present   Nurse Manager: __x___present   Student RNs: __x___present   Medical Students: _____present   Art Therapist: __x___present   Clinical Coordinator: _____present   Internal : __x___present   Occupational Therapist: __x___present     Plan of care discussed and updated as appropriate.

## 2018-01-24 NOTE — BSMART NOTE
OCCUPATIONAL THERAPY PROGRESS NOTE  Group Time:  3619  Attendance: The patient attended full group. Participation:  The patient participated with minimal elaboration in the activity. Attention:  The patient was able to focus on the activity. .  Interaction:  The patient acknowledges others or responds to questions,  with no spontaneous interaction. Participates as called on with few spontaneous comments and little to no interaction with peers.

## 2018-01-24 NOTE — BH NOTES
Pt. Received a visit from his father, visit went well. Pt. has been polite and cooperative in the milieu socializing with staff and peers. Pt. denies suicidal/homicidal ideations, audio/visual hallucination. Pt contracts for safety on the unit agree to come to staff if feeling harm to self or others. Pt.denies any new medical/pain complaints. Pt. ate 100% of meals and took scheduled medications. Staff encouraged Pt. to  participate in treatment,  medication and group therapy. Pt agreed. Pt. remain free of falls and provided non skid socks. Staff will continue to monitor Pt. for behavior safety and location.

## 2018-01-24 NOTE — PROGRESS NOTES
[address]  Inpatient Progress Note     Date of Service: 01/24/18  Hospital Day: 1     Subjective/Interval History   01/24/18    Treatment Team Notes:  Notes reviewed and/or discussed and report that [facility] demonstrated no interval concerns. Attends groups. Patient interview: [facility] was interviewed by this writer today. States Jewel Vancleave is causing him to feel emotional at times but pt is willing to stay on Jewel Vancleave. He denies other medication side effects. Sleep and appetite are improved. Pt denies SI, HI and AVH. Objective     Vitals:    01/22/18 1625 01/23/18 0248 01/23/18 0331 01/23/18 0745   BP: (!) 147/92 135/86 134/83 120/73   Pulse: (!) 103 80 70 79   Resp: 18 16 20 18   Temp: 98.2 °F (36.8 °C) 97.8 °F (36.6 °C) 97.3 °F (36.3 °C) 97.2 °F (36.2 °C)   SpO2: 100% 99%     Weight: 99.8 kg (220 lb)  99.8 kg (220 lb)    Height: 5' 10\" (1.778 m)  5' 10\" (1.778 m)        Mental Status Examination     Appearance/Hygiene 32 y.o. overweight CM. Behavior/Social Relatedness Appropriate/relates well.     Musculoskeletal Gait/Station: appropriate  Tone (flaccid, cogwheeling, spastic): not assessed  Psychomotor (hyperkinetic, hypokinetic): appropriate   Involuntary movements (tics, dyskinesias, akathisia, stereotypies): none   Speech   Rate, rhythm, volume, fluency and articulation are appropriate   Mood   sad   Affect    restricted   Thought Process Linear and goal directed   Thought Content and Perceptual Disturbances Denies self-injurious behavior (SIB), suicidal ideation (SI), aggressive behavior or homicidal ideation (HI)    Denies auditory and visual hallucinations   Sensorium and Cognition  Grossly intact   Insight  fair   Judgment fair        Assessment/Plan      Psychiatric Diagnoses:        Patient Active Problem List   Diagnosis Code    Bipolar 2 disorder, major depressive episode ([facility] F31.81         Medical Diagnoses: Elevated liver enzymes     Psychosocial and contextual factors:                         1.  Medication non-compliance     Level of impairment/disability: Moderate      Loma Duverney is a 32 y.o. who is currently improving. He reports feeling more emotional with Latuda use but this resolves with continued use of the medication. He states he will remain on the medication because the side effect is tolerable. Pt denies SI, HI and AVH. 1.  Continue current treatment plan for Bipolar 2 disorder. 2.  Reviewed instructions, risks, benefits and side effects of medications  3. Disposition/Discharge Date: self-care/home, home/1-.      Susu Diego MD DR. [facility]  Psychiatry

## 2018-01-24 NOTE — PROGRESS NOTES
Problem: Suicide/Homicide (Adult/Pediatric)  Goal: *STG: Remains safe in hospital  Daily, patient will contract for safety; demonstrate safe behavior on unit; monitor for safety per protocol while in the hospital.   Outcome: Progressing Towards Goal  Patient remains safe in hospital.    Comments: Patient remains safe while in hospital.

## 2018-01-24 NOTE — PROGRESS NOTES
Problem: Suicide/Homicide (Adult/Pediatric)  Goal: *STG: Attends activities and groups  Outcome: Progressing Towards Goal  Patient attends activities and groups. Comments: Patient does attend groups and activities.

## 2018-01-25 VITALS
RESPIRATION RATE: 20 BRPM | DIASTOLIC BLOOD PRESSURE: 77 MMHG | HEIGHT: 70 IN | TEMPERATURE: 97.7 F | OXYGEN SATURATION: 99 % | HEART RATE: 85 BPM | WEIGHT: 220 LBS | BODY MASS INDEX: 31.5 KG/M2 | SYSTOLIC BLOOD PRESSURE: 139 MMHG

## 2018-01-25 LAB
ALBUMIN SERPL-MCNC: 3.8 G/DL (ref 3.4–5)
ALBUMIN/GLOB SERPL: 1.2 {RATIO} (ref 0.8–1.7)
ALP SERPL-CCNC: 52 U/L (ref 45–117)
ALT SERPL-CCNC: 173 U/L (ref 16–61)
AST SERPL-CCNC: 57 U/L (ref 15–37)
BILIRUB DIRECT SERPL-MCNC: 0.1 MG/DL (ref 0–0.2)
BILIRUB SERPL-MCNC: 0.5 MG/DL (ref 0.2–1)
GLOBULIN SER CALC-MCNC: 3.3 G/DL (ref 2–4)
PROT SERPL-MCNC: 7.1 G/DL (ref 6.4–8.2)

## 2018-01-25 PROCEDURE — 65220000005 HC RM SEMIPRIVATE PSYCH 3 OR 4 BED

## 2018-01-25 PROCEDURE — 80076 HEPATIC FUNCTION PANEL: CPT | Performed by: PSYCHIATRY & NEUROLOGY

## 2018-01-25 PROCEDURE — 74011250637 HC RX REV CODE- 250/637: Performed by: PSYCHIATRY & NEUROLOGY

## 2018-01-25 PROCEDURE — 36415 COLL VENOUS BLD VENIPUNCTURE: CPT | Performed by: PSYCHIATRY & NEUROLOGY

## 2018-01-25 RX ADMIN — LURASIDONE HYDROCHLORIDE 20 MG: 20 TABLET, FILM COATED ORAL at 08:05

## 2018-01-25 RX ADMIN — LITHIUM CARBONATE 150 MG: 150 CAPSULE, GELATIN COATED ORAL at 20:12

## 2018-01-25 RX ADMIN — TRAZODONE HYDROCHLORIDE 50 MG: 50 TABLET ORAL at 20:12

## 2018-01-25 RX ADMIN — LORAZEPAM 1 MG: 1 TABLET ORAL at 11:30

## 2018-01-25 RX ADMIN — LITHIUM CARBONATE 150 MG: 150 CAPSULE, GELATIN COATED ORAL at 08:06

## 2018-01-25 NOTE — BH NOTES
Omega Eric is not participating in Recreational Therapy. Group time: 1 hour    Personal goal for participation: fresh air break    Goal orientation: social    Group therapy participation: refuse    Therapeutic interventions reviewed and discussed: Staff encouraged Pt.  To participate in group    Impression of participation: Pt refuse, chose to rest in bed despite staff encouragement

## 2018-01-25 NOTE — BSMART NOTE
OCCUPATIONAL THERAPY PROGRESS NOTE  Group Time:  9927  Attendance: The patient attended full group. Participation:  The patient participated with moderate elaboration in the activity. Attention:  The patient was able to focus on the activity. Interaction:  The patient acknowledges others or responds to questions,  with no spontaneous interaction. Identified goal to work on related to letting people in his life know about his \"depression\" and ID early warning signs to let specific other people know what to look for.

## 2018-01-25 NOTE — BH NOTES
Patient asked for something for his anxiety stating that he is feeling cooped up and all the yelling from the other patient is also causing him anxiety. Patient is hopeful about discharge tomorrow. Patient was medicated with 1mg of Ativan.

## 2018-01-25 NOTE — PROGRESS NOTES
[address]  Inpatient Progress Note     Date of Service: 01/25/18  Hospital Day: 2     Subjective/Interval History   01/25/18    Treatment Team Notes:  Notes reviewed and/or discussed and report that [facility] demonstrated no interval concerns. Attends groups. Patient interview: [facility] was interviewed by this writer today. Much brighter today. States [place] and Lithium are working well. He feels lithium is stabilizing his mood. Emotional variability to Latuda resolved. Pt in agreement with checking Lithium level tomorrow morning with possible D/C on 1-. Objective     Vitals:    01/22/18 1625 01/23/18 0248 01/23/18 0331 01/23/18 0745   BP: (!) 147/92 135/86 134/83 120/73   Pulse: (!) 103 80 70 79   Resp: 18 16 20 18   Temp: 98.2 °F (36.8 °C) 97.8 °F (36.6 °C) 97.3 °F (36.3 °C) 97.2 °F (36.2 °C)   SpO2: 100% 99%     Weight: 99.8 kg (220 lb)  99.8 kg (220 lb)    Height: 5' 10\" (1.778 m)  5' 10\" (1.778 m)        Mental Status Examination     Appearance/Hygiene 32 y.o. overweight CM. Behavior/Social Relatedness Appropriate/relates well.     Musculoskeletal Gait/Station: appropriate  Tone (flaccid, cogwheeling, spastic): not assessed  Psychomotor (hyperkinetic, hypokinetic): appropriate   Involuntary movements (tics, dyskinesias, akathisia, stereotypies): none   Speech   Rate, rhythm, volume, fluency and articulation are appropriate   Mood   euthymic   Affect    euthymic   Thought Process Linear and goal directed   Thought Content and Perceptual Disturbances Denies self-injurious behavior (SIB), suicidal ideation (SI), aggressive behavior or homicidal ideation (HI)    Denies auditory and visual hallucinations   Sensorium and Cognition  Grossly intact   Insight  fair   Judgment fair        Assessment/Plan      Psychiatric Diagnoses:        Patient Active Problem List   Diagnosis Code    Bipolar 2 disorder, major depressive episode ([facility] F31.81       Medical Diagnoses: Elevated liver enzymes     Psychosocial and contextual factors:                         1.  Medication non-compliance     Level of impairment/disability: Moderate      [place] Session is a 32 y.o. who is currently improving. Feels side effects of Latuda resolved and Lithium is helpful. Pt willing to have Lithium level checked on 1- with possible d/c on that same date. Pt denies SI, HI and AVH. 1.  Continue current treatment plan for Bipolar 2 disorder. 2.  Reviewed instructions, risks, benefits and side effects of medications  3. Disposition/Discharge Date: self-care/home, home/1-.      Marilyn Aldridge MD [facility]. [facility]  Psychiatry

## 2018-01-25 NOTE — BH NOTES
Pt more seclusive this shift although out for meals and meds. He reports possible discharge tomorrow   and says he just can't stand all the yelling. Had been in room playing cards or reading. Encouraged to focus on    goals for discharge.  Cont monitoring and offer staff assistance if needed for safety also wearing non skid socks

## 2018-01-25 NOTE — BSMART NOTE
ART THERAPY GROUP PROGRESS NOTE    Group time:1416    The patient did not awaken/get up when called for group.
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
## 2018-01-26 LAB — LITHIUM SERPL-SCNC: <0.2 MMOL/L (ref 0.6–1.2)

## 2018-01-26 PROCEDURE — 80178 ASSAY OF LITHIUM: CPT | Performed by: PSYCHIATRY & NEUROLOGY

## 2018-01-26 PROCEDURE — 36415 COLL VENOUS BLD VENIPUNCTURE: CPT | Performed by: PSYCHIATRY & NEUROLOGY

## 2018-01-26 PROCEDURE — 74011250637 HC RX REV CODE- 250/637: Performed by: PSYCHIATRY & NEUROLOGY

## 2018-01-26 RX ORDER — TRAZODONE HYDROCHLORIDE 50 MG/1
50 TABLET ORAL
Qty: 30 TAB | Refills: 0 | Status: SHIPPED | OUTPATIENT
Start: 2018-01-26

## 2018-01-26 RX ORDER — LITHIUM CARBONATE 150 MG/1
150 CAPSULE ORAL
Qty: 90 CAP | Refills: 0 | Status: SHIPPED | OUTPATIENT
Start: 2018-01-26 | End: 2018-02-20

## 2018-01-26 RX ADMIN — LITHIUM CARBONATE 150 MG: 150 CAPSULE, GELATIN COATED ORAL at 08:20

## 2018-01-26 RX ADMIN — LURASIDONE HYDROCHLORIDE 20 MG: 20 TABLET, FILM COATED ORAL at 08:20

## 2018-01-26 NOTE — BH NOTES
Patient appears as well groomed and hygienic. Patient presents with animated affect and euthymic mood. He attended and participated in some groups, and demonstrated appropriate behavior during interaction with peers and staff. Patient took all prescribed evening medications. He reported an improvement in mood and symptoms during 1:1. Patient identified proper medication management for after discharge. Dante Ellis fall risk was completed and interventions maintained; no fall during shift of care. Patient did not engage in any hazardous activities that may result in harm.

## 2018-01-26 NOTE — DISCHARGE SUMMARY
[facility]  Inpatient Psychiatry   Discharge Summary     Admit date: 1/22/2018    Discharge date and time: 1/26/2018  8:55 AM    Discharge Physician: Markel Fletcher MD    DISCHARGE DIAGNOSES     Psychiatric Diagnoses:           Patient Active Problem List   Diagnosis Code    Bipolar 2 disorder, major depressive episode ([facility] F31.81           Medical Diagnoses: Elevated liver enzymes      Psychosocial and contextual factors:                         3.  Medication non-compliance  Aminta Miller presented to the inpatient unit for inpatient psychiatric hospitalization after developing SI with multiple plans including cutting his wrists or jumping off a bridge in the context of medication non-compliance for 1 year. Duration of symptoms is 6 months. Pt noted to be crying in triage, with inability to focus and with delayed answers. Pt endorses sleep disturbance and drinks whiskey to help with insomnia. Medications included Lithium and Latuda. Pt did not return to outpt treatment to obtain medication because the process causes him anxiety. The pt was restarted on Lithium 150mg po TID and started on Latuda 20mg po Qbreakfast.  The pt tolerated these medications well. He shared when he starts [place], it causes him to feel more emotional.  This occurred with a duration of 1 day and resolved. The pt was smiling, laughing and noted to feel much better after restarting home medications. He was also noted to be much brighter. Medically the pt remained stable while hospitalized. We discussed elevated LFTs and the pt was told to follow-up with a primary care physician for work-up and monitoring. The pt was not a behavioral concern while hospitalized. he attended groups, was medication compliant and behaviorally appropriate. Pt denied medication side effects including TD, EPS, akathisia and mood derangements.       On 1/26/2018 the pt was deemed psychiatrically stable and discharged to home. The pt denied SI, HI and AVH. DISPOSITION/FOLLOW-UP     Disposition: home    Follow-up Appointments:  Outpt at [facility]. .. at [facility]. .. with Dr Machelle Biwsas on Tuesday 1/30/18 @ 1PM.. Please arrive 12:30pm to complete registration. . A Medication Appointment will be made after this Initial session. MEDICATION CHANGES   Outpatient medications:  No current facility-administered medications on file prior to encounter. No current outpatient prescriptions on file prior to encounter. Medications discontinued during hospitalization:  Medications Discontinued During This Encounter   Medication Reason    traZODone (DESYREL) tablet 50 mg          Discharged medication:  Current Discharge Medication List          Instructions, risks (black box warning), benefits and side effects (EPS, TD, NMS) were discussed in detail prior to discharge. Patient denied any adverse medication side effects prior to discharge.        LABS/IMAGING DURING ADMISSION     Results for orders placed or performed during the hospital encounter of 01/22/18   DRUG SCREEN, URINE   Result Value Ref Range    BENZODIAZEPINES NEGATIVE  NEG      BARBITURATES NEGATIVE  NEG      THC (TH-CANNABINOL) NEGATIVE  NEG      OPIATES NEGATIVE  NEG      PCP(PHENCYCLIDINE) NEGATIVE  NEG      COCAINE NEGATIVE  NEG      AMPHETAMINES NEGATIVE  NEG      METHADONE NEGATIVE  NEG      HDSCOM (NOTE)    ETHYL ALCOHOL   Result Value Ref Range    ALCOHOL(ETHYL),SERUM <3 0 - 3 MG/DL   CBC WITH AUTOMATED DIFF   Result Value Ref Range    WBC 10.1 4.6 - 13.2 K/uL    RBC 5.14 4.70 - 5.50 M/uL    HGB 15.5 13.0 - 16.0 g/dL    HCT 45.7 36.0 - 48.0 %    MCV 88.9 74.0 - 97.0 FL    MCH 30.2 24.0 - 34.0 PG    MCHC 33.9 31.0 - 37.0 g/dL    RDW 12.4 11.6 - 14.5 %    PLATELET 671 571 - 655 K/uL    MPV 11.9 (H) 9.2 - 11.8 FL    NEUTROPHILS 69 40 - 73 %    LYMPHOCYTES 21 21 - 52 %    MONOCYTES 9 3 - 10 %    EOSINOPHILS 1 0 - 5 %    BASOPHILS 0 0 - 2 %    ABS. NEUTROPHILS 7.0 1.8 - 8.0 K/UL    ABS. LYMPHOCYTES 2.1 0.9 - 3.6 K/UL    ABS. MONOCYTES 0.9 0.05 - 1.2 K/UL    ABS. EOSINOPHILS 0.1 0.0 - 0.4 K/UL    ABS. BASOPHILS 0.0 0.0 - 0.1 K/UL    DF AUTOMATED     URINALYSIS W/ RFLX MICROSCOPIC   Result Value Ref Range    Color DARK YELLOW      Appearance CLEAR      Specific gravity >1.030 (H) 1.005 - 1.030    pH (UA) 6.0 5.0 - 8.0      Protein 30 (A) NEG mg/dL    Glucose NEGATIVE  NEG mg/dL    Ketone TRACE (A) NEG mg/dL    Bilirubin SMALL (A) NEG      Blood NEGATIVE  NEG      Urobilinogen 1.0 0.2 - 1.0 EU/dL    Nitrites NEGATIVE  NEG      Leukocyte Esterase TRACE (A) NEG     METABOLIC PANEL, COMPREHENSIVE   Result Value Ref Range    Sodium 141 136 - 145 mmol/L    Potassium 3.8 3.5 - 5.5 mmol/L    Chloride 104 100 - 108 mmol/L    CO2 31 21 - 32 mmol/L    Anion gap 6 3.0 - 18 mmol/L    Glucose 87 74 - 99 mg/dL    BUN 10 7.0 - 18 MG/DL    Creatinine 0.81 0.6 - 1.3 MG/DL    BUN/Creatinine ratio 12 12 - 20      GFR est AA >60 >60 ml/min/1.73m2    GFR est non-AA >60 >60 ml/min/1.73m2    Calcium 9.0 8.5 - 10.1 MG/DL    Bilirubin, total 0.7 0.2 - 1.0 MG/DL    ALT (SGPT) 190 (H) 16 - 61 U/L    AST (SGOT) 59 (H) 15 - 37 U/L    Alk.  phosphatase 60 45 - 117 U/L    Protein, total 8.0 6.4 - 8.2 g/dL    Albumin 4.7 3.4 - 5.0 g/dL    Globulin 3.3 2.0 - 4.0 g/dL    A-G Ratio 1.4 0.8 - 1.7     URINE MICROSCOPIC ONLY   Result Value Ref Range    WBC 1 to 4 0 - 4 /hpf    RBC 0 to 2 0 - 5 /hpf    Epithelial cells FEW 0 - 5 /lpf    Bacteria FEW (A) NEG /hpf    Mucus 2+ (A) NEG /lpf   TSH 3RD GENERATION   Result Value Ref Range    TSH 0.86 0.36 - 3.74 uIU/mL   LIPID PANEL   Result Value Ref Range    LIPID PROFILE          Cholesterol, total 179 <200 MG/DL    Triglyceride 163 (H) <150 MG/DL    HDL Cholesterol 46 40 - 60 MG/DL    LDL, calculated 100.4 (H) 0 - 100 MG/DL    VLDL, calculated 32.6 MG/DL    CHOL/HDL Ratio 3.9 0 - 5.0     HEPATIC FUNCTION PANEL   Result Value Ref Range    Protein, total 7.1 6.4 - 8.2 g/dL    Albumin 3.8 3.4 - 5.0 g/dL    Globulin 3.3 2.0 - 4.0 g/dL    A-G Ratio 1.2 0.8 - 1.7      Bilirubin, total 0.5 0.2 - 1.0 MG/DL    Bilirubin, direct 0.1 0.0 - 0.2 MG/DL    Alk. phosphatase 52 45 - 117 U/L    AST (SGOT) 57 (H) 15 - 37 U/L    ALT (SGPT) 173 (H) 16 - 61 U/L        DISCHARGE MENTAL STATUS EVALUATION     Appearance/Hygiene 32 y.o. overweight CM. Behavior/Social Relatedness Appropriate/relates well. Musculoskeletal Gait/Station: appropriate  Tone (flaccid, cogwheeling, spastic): not assessed  Psychomotor (hyperkinetic, hypokinetic): appropriate   Involuntary movements (tics, dyskinesias, akathisia, stereotypies): none   Speech                          Rate, rhythm, volume, fluency and articulation are appropriate   Mood                          euthymic   Affect                                                   euthymic   Thought Process Linear and goal directed   Thought Content and Perceptual Disturbances Denies self-injurious behavior (SIB), suicidal ideation (SI), aggressive behavior or homicidal ideation (HI)     Denies auditory and visual hallucinations   Sensorium and Cognition              Grossly intact   Insight              good   Judgment good          SUICIDE RISK ASSESSMENT     [] Admission  [x] Discharge     Key Factors:   Current admission precipitated by suicide attempt?   []  Yes     2    [x]  No     1     Suicide Attempt History  [] Past attempts of high lethality    2 []  Past attempts of low lethality    1 [x]  No previous attempts       0   Suicidal Ideation []  Constant suicidal thoughts      2 []  Intermittent or fleeting suicidal  thoughts  1 [x]  Denies current suicidal thoughts    0   Suicide Plan   []  Has plan with actual OR potential access to planned method    2 []  Has plan without access to planned method      1 [x]  No plan            0   Plan Lethality []  Highly lethal plan (Carbon monoxide, gun, hanging, jumping)    2 []  Moderate lethality of plan          1 [x]  Low lethality of plan (biting, head banging, superficial scratching, pillow over face)  0   Safety Plan Agreement  []  Unwilling OR unable to agree due to impaired reality testing   2   []  Patient is ambivalent and/or guarded      1 [x]  Reliably agrees        0   Current Morbid Thoughts (reunion fantasies, preoccupations with death) []  Constantly     2     []  Frequently    1 [x]  Rarely    0   Elopement Risk  []  High risk     2 []  Moderate risk    1 [x]   Low risk    0   Symptoms    []  Hopeless  []  Helpless  []  Anhedonia   []  Guilt/shame  []  Anger/rage  []  Anxiety  []  Insomnia   []  Agitation   []  Impulsivity  []  5-6 symptoms present    2 []  3-4 symptoms present    1  [x]  0-2 symptoms present    0     Scoring Key:  10 or higher = Imminent Risk (consider 1:1)  4 - 9 = Moderate Risk (consider q 15 minute observation)Attended alcohol, tobacco, prescription and other drug psychoeducation group.   0 - 3 = Low Risk (consider q 30 minute observation)    Total Score: 1  ------------------------------------------------------------------------------------------------------------------  PLEASE ADDRESS THE FOLLOWING 5 ISSUES     Physician's Subjective Appraisal of Risk (check one):  []  Patient replies not trustworthy: several non-verbal cues. []  Patient replies questionable: trustworthy: at least 1 non-verbal cue. [x]  Patient replies appear trustworthy. Family History of Suicide?    []  Yes  [x]  No    Protective measures (select all that apply):  [x]  Successful past responses to stress  []  Spiritual/Jewish beliefs  [x]  Capacity for reality testing  [x]  Positive therapeutic relationships  [x]  Social supports/connections  [x]  Positive coping skills  [x]  Frustration tolerance/optimism  []  Children or pets in the home  [x]  Sense of responsibility to family  [x]  Agrees to treatment plan and follow up    Others (list):    High Risk Diagnoses (select all that apply):  [x]  Depression/Bipolar Disorder  []  Dual Diagnosis  []  Cardiovascular Disease  []  Schizophrenia  []  Chronic Pain  []  Epilepsy  []  Cancer  []  Personality Disorder  []  HIV/AIDS  []  Multiple Sclerosis    Dangerousness Assessment (Suicide, homicide, property destruction. ..)    Risk Factors reviewed and risk assessed to be:  [] low  [] low-moderate  [x] moderate   [] moderate-high  [] high     Protection factors reviewed and risk assessed to be:  [x] low  [] low-moderate  [] moderate   [] moderate-high  [] high     Response to treatment and risk assessed to be:  [x] low  [] low-moderate  [] moderate   [] moderate-high  [] high     Support reviewed and risk assessed to be:  [x] low  [] low-moderate  [] moderate   [] moderate-high  [] high     Acceptance of Discharge and outpatient treatment reviewed and risk assessed to be:    [x] low  [] low-moderate  [] moderate   [] moderate-high  [] high   Overall risk assessed to be:  [x] low  [x] low-moderate  [] moderate   [] moderate-high  [] high     Completion of discharge was greater than 30 minutes. Over 50% of today's discharge was geared towards counseling and coordination of care.           Susu Diego MD  Psychiatry  [facility]. [facility]

## 2018-01-26 NOTE — DISCHARGE INSTRUCTIONS
BEHAVIORAL HEALTH NURSING DISCHARGE NOTE      Emergency Numbers    : [facility] Emergency Services: [phone]  Suicide Prevention Line: [phone] (TALK)      The following personal items collected during your admission are returned to you:   Dental Appliance: Dental Appliances: None  Vision:    Hearing Aid:    Jewelry: Jewelry: None  Clothing: Clothing: Belt, Footwear, Pants, Shirt, Socks, Undergarments  Other Valuables: Other Valuables: Cell Phone, [address] [facility], [place] [phone] sent to safe: Personal Items Sent to Safe:  (two debit visa cards,  identification)        The discharge information has been reviewed with the patient. The patient verbalized understanding. [employer] Activation    Thank you for requesting access to [employer]. Please follow the instructions below to securely access and download your online medical record. [employer] allows you to send messages to your doctor, view your test results, renew your prescriptions, schedule appointments, and more. How Do I Sign Up? In your internet browser, go to [email or web address]  Click on the First Time User? Click Here link in the Sign In box. You will be redirect to the New Member Sign Up page. Enter your [employer] Access Code exactly as it appears below. You will not need to use this code after youve completed the sign-up process. If you do not sign up before the expiration date, you must request a new code. [employer] Access Code: [email or web address]  Expires: 2018  9:10 AM (This is the date your [employer] access code will )    Enter the last four digits of your Social Security Number (xxxx) and Date of Birth (mm/dd/yyyy) as indicated and click Submit. You will be taken to the next sign-up page. Create a [employer] ID. This will be your [employer] login ID and cannot be changed, so think of one that is secure and easy to remember. Create a [employer] password. You can change your password at any time.   Enter your Password Reset Question and Answer. This can be used at a later time if you forget your password. Enter your e-mail address. You will receive e-mail notification when new information is available in [address] Click Sign Up. You can now view and download portions of your medical record. Click the [employer] link to download a portable copy of your medical information. Additional Information    If you have questions, please visit the Frequently Asked Questions section of the [employer] website at [email or web address] Remember, [employer] is NOT to be used for urgent needs. For medical emergencies, dial 911.     Patient armband removed and shredded

## 2018-01-26 NOTE — PROGRESS NOTES
Problem: Suicide/Homicide (Adult/Pediatric)  Goal: *STG/LTG: Complies with medication therapy  Patient will comply with medications as ordered; verbalize the importance for medications and compliance; report medication side effects to doctor or nurse while hospitalized. Outcome: Progressing Towards Goal  Took all prescribed evening medications. Problem: Depressed Mood (Adult/Pediatric)  Goal: *STG: Demonstrates reduction in symptoms and increase in insight into coping skills/future focused  Daily, patient will demonstrate reduction in symptoms and increase in insight into coping skills/future focused prior to discharge. Outcome: Progressing Towards Goal  Reported an improvement in mood and symptoms during 1:1. Problem: Falls - Risk of  Goal: *Absence of Falls  Document Rossy Fall Risk and appropriate interventions in the flowsheet. Daily, patient will remain free from falls during hospital stay. Outcome: Progressing Towards Goal  Rossy fall risk completed and interventions maintained.     Comments: Took all prescribed evening medications.     Reported an improvement in mood and symptoms during 1:1.     Rossy fall risk completed and interventions maintained.

## 2018-01-26 NOTE — BH NOTES
Carmen Ortiz is participating in Music Therapy. Group time: 1    Personal goal for participation:  Relax while listening to Aromatherapy music    Goal orientation: relaxation    Group therapy participation: active    Therapeutic interventions reviewed and discussed: Staff encouraged Pt.  To participate in listening to soft music    Impression of participation:  Pt. was calm relax reading a book  while listening to [address]

## 2018-01-26 NOTE — BH NOTES
Patient is being discharged off unit with his belongings, discharge paperwork and prescriptions. Patient was planning to walk home as he is only a few blocks away.

## 2018-02-20 RX ORDER — LITHIUM CARBONATE 150 MG/1
150 CAPSULE ORAL
Qty: 90 CAP | Refills: 0 | Status: SHIPPED | OUTPATIENT
Start: 2018-02-20